# Patient Record
Sex: MALE | Race: WHITE | NOT HISPANIC OR LATINO | Employment: FULL TIME | URBAN - METROPOLITAN AREA
[De-identification: names, ages, dates, MRNs, and addresses within clinical notes are randomized per-mention and may not be internally consistent; named-entity substitution may affect disease eponyms.]

---

## 2022-12-27 ENCOUNTER — HOSPITAL ENCOUNTER (EMERGENCY)
Facility: HOSPITAL | Age: 22
Discharge: HOME | End: 2022-12-27
Attending: INTERNAL MEDICINE
Payer: COMMERCIAL

## 2022-12-27 VITALS
DIASTOLIC BLOOD PRESSURE: 82 MMHG | HEART RATE: 81 BPM | TEMPERATURE: 98 F | RESPIRATION RATE: 18 BRPM | WEIGHT: 214.63 LBS | OXYGEN SATURATION: 99 % | SYSTOLIC BLOOD PRESSURE: 134 MMHG

## 2022-12-27 DIAGNOSIS — S60.221A CONTUSION OF RIGHT HAND, INITIAL ENCOUNTER: Primary | ICD-10-CM

## 2022-12-27 PROCEDURE — 99283 EMERGENCY DEPT VISIT LOW MDM: CPT

## 2022-12-27 NOTE — ED PROVIDER NOTES
Encounter Date: 12/27/2022       History     Chief Complaint   Patient presents with    Hand Injury     States punched table and wall at 0100 this am. C/o pain to R hand.     22-year-old white male presents emergency department after punching a table several times and then turned around punched a wall with the right hand and he states is having trouble moving his 3rd and 4th digit so he wanted to come to see if it was broken    Review of patient's allergies indicates:  No Known Allergies  Past Medical History:   Diagnosis Date    Anxiety disorder, unspecified     Depression     PTSD (post-traumatic stress disorder)      No past surgical history on file.  No family history on file.  Social History     Tobacco Use    Smoking status: Never    Smokeless tobacco: Never   Substance Use Topics    Alcohol use: Yes    Drug use: Never     Review of Systems   Constitutional: Negative.  Negative for activity change, appetite change, chills, diaphoresis, fatigue, fever and unexpected weight change.   HENT: Negative.  Negative for congestion, dental problem, drooling, ear discharge, ear pain, facial swelling, hearing loss, mouth sores, nosebleeds, postnasal drip, rhinorrhea, sinus pressure, sinus pain, sneezing, sore throat, tinnitus, trouble swallowing and voice change.    Eyes: Negative.  Negative for photophobia, pain, discharge, redness, itching and visual disturbance.   Respiratory: Negative.  Negative for apnea, cough, choking, chest tightness, shortness of breath, wheezing and stridor.    Cardiovascular: Negative.  Negative for chest pain, palpitations and leg swelling.   Gastrointestinal: Negative.  Negative for abdominal distention, abdominal pain, anal bleeding, blood in stool, constipation, diarrhea, nausea, rectal pain and vomiting.   Endocrine: Negative.  Negative for cold intolerance, heat intolerance, polydipsia, polyphagia and polyuria.   Genitourinary: Negative.  Negative for decreased urine volume, difficulty  urinating, dysuria, enuresis, flank pain, frequency, genital sores, hematuria, penile discharge, penile pain, penile swelling, scrotal swelling, testicular pain and urgency.   Musculoskeletal: Negative.  Negative for arthralgias, back pain, gait problem, joint swelling, myalgias, neck pain and neck stiffness.        Hand pain   Skin: Negative.  Negative for color change, pallor, rash and wound.   Allergic/Immunologic: Negative.  Negative for environmental allergies, food allergies and immunocompromised state.   Neurological: Negative.  Negative for dizziness, tremors, seizures, syncope, facial asymmetry, speech difficulty, weakness, light-headedness, numbness and headaches.   Hematological: Negative.  Negative for adenopathy. Does not bruise/bleed easily.   Psychiatric/Behavioral: Negative.  Negative for agitation, behavioral problems, confusion, decreased concentration, dysphoric mood, hallucinations, self-injury, sleep disturbance and suicidal ideas. The patient is not nervous/anxious and is not hyperactive.    All other systems reviewed and are negative.    Physical Exam     Initial Vitals [12/27/22 0330]   BP Pulse Resp Temp SpO2   139/87 83 16 98.4 °F (36.9 °C) 99 %      MAP       --         Physical Exam    Nursing note and vitals reviewed.  Constitutional: He appears well-developed and well-nourished.   HENT:   Head: Normocephalic and atraumatic.   Eyes: Conjunctivae and EOM are normal. Pupils are equal, round, and reactive to light.   Neck: Neck supple.   Normal range of motion.  Cardiovascular:  Normal rate and regular rhythm.           Pulmonary/Chest: Breath sounds normal.   Abdominal: Abdomen is soft. Bowel sounds are normal.   Musculoskeletal:         General: Normal range of motion.        Hands:       Cervical back: Normal range of motion and neck supple.     Neurological: He is alert and oriented to person, place, and time.   Skin: Skin is warm and dry. Capillary refill takes less than 2 seconds.    Psychiatric: He has a normal mood and affect. His behavior is normal. Judgment and thought content normal.       ED Course   Procedures  Labs Reviewed - No data to display       Imaging Results              X-Ray Hand 3 view Right (Preliminary result)  Result time 12/27/22 04:07:57      ED Interpretation by Clarke Alanis MD (12/27/22 04:07:57, [facility])    No acute fractures or dislocations noted                                     Medications - No data to display                           Clinical Impression:   Final diagnoses:  [S60.221A] Contusion of right hand, initial encounter (Primary)        ED Disposition Condition    Discharge Stable          ED Prescriptions    None       Follow-up Information       Follow up With Specialties Details Why Contact Info    Surinder Prasad III, APRN-CNP Family Medicine In 3 days  [address] [facility] [address] [phone]            Kiersten Bangura is a certified MA and was present during the entire interaction with this patient      Clarke Alanis MD  12/27/22 7195

## 2024-12-24 ENCOUNTER — OFFICE VISIT (OUTPATIENT)
Dept: URGENT CARE | Facility: CLINIC | Age: 24
End: 2024-12-24
Payer: OTHER GOVERNMENT

## 2024-12-24 VITALS
DIASTOLIC BLOOD PRESSURE: 91 MMHG | TEMPERATURE: 99 F | RESPIRATION RATE: 17 BRPM | SYSTOLIC BLOOD PRESSURE: 129 MMHG | HEIGHT: 69 IN | HEART RATE: 87 BPM | BODY MASS INDEX: 31.7 KG/M2 | OXYGEN SATURATION: 97 % | WEIGHT: 214 LBS

## 2024-12-24 DIAGNOSIS — J00 ACUTE NASOPHARYNGITIS: Primary | ICD-10-CM

## 2024-12-24 DIAGNOSIS — R68.89 FLU-LIKE SYMPTOMS: ICD-10-CM

## 2024-12-24 LAB
CTP QC/QA: YES
CTP QC/QA: YES
MOLECULAR STREP A: NEGATIVE
POC MOLECULAR INFLUENZA A AGN: NEGATIVE
POC MOLECULAR INFLUENZA B AGN: NEGATIVE

## 2024-12-24 RX ORDER — BETAMETHASONE SODIUM PHOSPHATE AND BETAMETHASONE ACETATE 3; 3 MG/ML; MG/ML
9 INJECTION, SUSPENSION INTRA-ARTICULAR; INTRALESIONAL; INTRAMUSCULAR; SOFT TISSUE ONCE
Status: COMPLETED | OUTPATIENT
Start: 2024-12-24 | End: 2024-12-24

## 2024-12-24 RX ADMIN — BETAMETHASONE SODIUM PHOSPHATE AND BETAMETHASONE ACETATE 9 MG: 3; 3 INJECTION, SUSPENSION INTRA-ARTICULAR; INTRALESIONAL; INTRAMUSCULAR; SOFT TISSUE at 12:12

## 2024-12-24 NOTE — PROGRESS NOTES
"Subjective:      Patient ID: Johnathan Lewis is a 24 y.o. male.    Vitals:  height is 5' 9" (1.753 m) and weight is 97.1 kg (214 lb). His oral temperature is 98.6 °F (37 °C). His blood pressure is 129/91 (abnormal) and his pulse is 87. His respiration is 17 and oxygen saturation is 97%.     Chief Complaint: Sore Throat (Started 1 days, sore throat, cough, congestion, fatigue, gen body aches, bilateral ear pain, fever last night, chills)    HPI:  24-year-old male present to clinic with concerns of nasal congestion, sinus congestion, sore throat and bilateral ear pressure worse on right side and fever started last night.  T-max at home 99.6.  Over-the-counter medications some help.  No fever in the clinic.  No chest pain, no shortness a breath.  States known for strep in the past.  No positive exposure to infections recently.  Requesting for cortisone injection as it helped in the past, understands the risks and benefits    ROS :  Constitutional : _ fever , Body aches, Chills  Eyes : _No redness, drainage or pain  HENT_sore throat, postnasal drainage  Respiratory_no wheezing, no shortness of breath  Cardiovascular_no chest pain  Gastrointestinal_ denies nausea vomiting abdominal pain or diarrhea  Musculoskeletal_no joint pain, no joint swelling  Integumentary_no skin rash    Objective:     Physical Exam  General : Alert and Oriented, No apparent distress, afebrile, sounds stuffy and congested  Neck - supple  HENT : Oropharynx and tonsils mild redness and swelling, no exudate, tonsils 2+ bilateral, left ear canal cerumen impaction, right TM intact moderate fluid, no redness  Respiratory : Bilateral equal breath sounds, nonlabored respirations  Cardiovascular : Rate, rhythm regular, normal volume pulse, no murmur  Gastrointestinal: Full abdomen, soft, nontender to palpate  Integumentary : Warm, Dry and no rash    Assessment:     1. Acute nasopharyngitis    2. Flu-like symptoms      Plan:   Discussed the physical finding, " possible early testing examination.  Monitor the symptoms closely.  Flu test negative, strep test negative  Symptomatic treatment for now.  Antihistamine of choice like Claritin Zyrtec or Allegra for congestion  Warm saltwater gargles for sore throat.  Robitussin DM for cough and cold  Celestone IM today as anti inflammation for symptom relief, risk and benefits discussed voiced understanding  For worsening symptoms can return to clinic for re swabs as needed as nurse's visit  Call or return to clinic for any questions.  Voiced understanding    Acute nasopharyngitis  -     betamethasone acetate-betamethasone sodium phosphate injection 9 mg    Flu-like symptoms  -     POCT Influenza A/B MOLECULAR  -     POCT Strep A, Molecular

## 2024-12-24 NOTE — PATIENT INSTRUCTIONS
Discussed the physical finding, possible early testing examination.  Monitor the symptoms closely.  Flu test negative, strep test negative  Symptomatic treatment for now.  Antihistamine of choice like Claritin Zyrtec or Allegra for congestion  Warm saltwater gargles for sore throat.  Robitussin DM for cough and cold  Celestone IM today as anti inflammation for symptom relief, risk and benefits discussed voiced understanding  For worsening symptoms can return to clinic for re swabs as needed as nurse's visit  Call or return to clinic for any questions.  Voiced understanding

## 2025-02-06 DIAGNOSIS — M25.561 ACUTE PAIN OF RIGHT KNEE: Primary | ICD-10-CM

## 2025-02-12 ENCOUNTER — OFFICE VISIT (OUTPATIENT)
Dept: ORTHOPEDICS | Facility: CLINIC | Age: 25
End: 2025-02-12
Payer: COMMERCIAL

## 2025-02-12 ENCOUNTER — HOSPITAL ENCOUNTER (OUTPATIENT)
Dept: RADIOLOGY | Facility: CLINIC | Age: 25
Discharge: HOME | End: 2025-02-12
Attending: REHABILITATION UNIT
Payer: COMMERCIAL

## 2025-02-12 VITALS
DIASTOLIC BLOOD PRESSURE: 85 MMHG | HEIGHT: 69 IN | HEART RATE: 90 BPM | SYSTOLIC BLOOD PRESSURE: 133 MMHG | WEIGHT: 214.06 LBS | BODY MASS INDEX: 31.71 KG/M2

## 2025-02-12 DIAGNOSIS — M25.561 ACUTE PAIN OF RIGHT KNEE: ICD-10-CM

## 2025-02-12 PROCEDURE — 3008F BODY MASS INDEX DOCD: CPT | Mod: CPTII,,, | Performed by: REHABILITATION UNIT

## 2025-02-12 PROCEDURE — 3079F DIAST BP 80-89 MM HG: CPT | Mod: CPTII,,, | Performed by: REHABILITATION UNIT

## 2025-02-12 PROCEDURE — 99203 OFFICE O/P NEW LOW 30 MIN: CPT | Mod: ,,, | Performed by: REHABILITATION UNIT

## 2025-02-12 PROCEDURE — 3075F SYST BP GE 130 - 139MM HG: CPT | Mod: CPTII,,, | Performed by: REHABILITATION UNIT

## 2025-02-12 PROCEDURE — 73564 X-RAY EXAM KNEE 4 OR MORE: CPT | Mod: RT,,, | Performed by: REHABILITATION UNIT

## 2025-02-12 RX ORDER — DICLOFENAC SODIUM 75 MG/1
75 TABLET, DELAYED RELEASE ORAL 2 TIMES DAILY
Qty: 28 TABLET | Refills: 0 | Status: SHIPPED | OUTPATIENT
Start: 2025-02-12 | End: 2025-02-26

## 2025-02-12 NOTE — PROGRESS NOTES
Subjective:      Patient ID: Johnathan Lewis is a 24 y.o. male.    Chief Complaint: Pain of the Right Knee (MRI report in media done 2/10/25- Stated was doing training for  and stepped in a hole and fell forward. Present today wearing knee stabilizer and crutches. Has swelling in knee and feels very unstable. Is taking tylenol which helps with some of the pain. )    HPI:   Johnathan Lewis is a 24 y.o. male who presents today for initial evaluation of his right knee    History of Present Illness    CHIEF COMPLAINT:  - Right knee pain and injury    HPI:  Johnathan presents with a right knee injury sustained during  training approximately two weeks ago. On January 28, 2025, while sprinting during a training exercise, he stepped in a hole. His leg went in, his body went forward, and he felt a pop, accompanied by instant pain and swelling. He was unable to walk on the affected leg, reporting that it was unstable when attempting to bear weight.    He was immediately taken to [facility]'s emergency room, where X-rays were performed. He was given a knee immobilizer and crutches. An MRI was not performed at that time. For the past two weeks, he has been confined to the [facility] in [place], with limited movement of the knee.    Initial pain was located on the medial aspect of the knee, with some discomfort across the front. Pointing his toes down would cause a pulling sensation in the upper part of his knee. Currently, he characterizes symptoms as discomfort rather than severe pain.    Johnathan has been using OTC medications such as ibuprofen and Tylenol for pain management. An MRI was performed two days prior to this visit, the results of which were discussed during the consultation.    His mobility has been significantly limited due to the use of the knee immobilizer. He reports difficulty bending the knee and having to force it back up when attempting to move it.    Johnathan denies any history of knee  "problems.    PREVIOUS TREATMENTS:  - Johnathan was given a knee immobilizer and crutches at the ER immediately after the injury, which he has been using for the past two weeks.  - Johnathan has been trying to let the knee bend slightly while in the [place], but has had limited success.    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  - Johnathan was injured during  training, specifically during an exercise mission.  - The injury has affected the patient's ability to participate in training activities.  - Johnathan has been confined to the barracks for two weeks following the injury.    SOCIAL HISTORY:  -  History: Johnathan is in [employer].  - Johnathan was doing  training in preparation for deployment when the knee injury occurred.  - Johnathan is scheduled for deployment in April.      ROS:  Musculoskeletal: +joint pain, +joint swelling         Past Medical History:   Diagnosis Date    Anxiety disorder, unspecified     Depression     PTSD (post-traumatic stress disorder)      Past Surgical History:   Procedure Laterality Date    APPENDECTOMY  2010     Social History     Socioeconomic History    Marital status: Single   Tobacco Use    Smoking status: Never    Smokeless tobacco: Current     Types: Chew    Tobacco comments:     Chewing tobacco everyday   Substance and Sexual Activity    Alcohol use: Yes     Alcohol/week: 1.0 standard drink of alcohol     Types: 1 Cans of beer per week    Drug use: Never    Sexual activity: Not Currently     Partners: Female         Current Outpatient Medications:     diclofenac (VOLTAREN) 75 MG EC tablet, Take 1 tablet (75 mg total) by mouth 2 (two) times daily. for 14 days, Disp: 28 tablet, Rfl: 0  Review of patient's allergies indicates:  No Known Allergies    /85   Pulse 90   Ht 5' 9" (1.753 m)   Wt 97.1 kg (214 lb 1.1 oz)   BMI 31.61 kg/m² "     Comprehensive review of systems completed and negative except as per HPI.        Objective:   Head: Normocephalic, without obvious abnormality, atraumatic  Eyes: conjunctivae/corneas clear. EOM's intact  Ears: normal external appearance  Nose: Nares normal. Septum midline. Mucosa normal. No drainage  Throat: normal findings: lips normal without lesions  Lungs: unlabored breathing on room air  Chest wall: symmetric chest rise  Heart: regular rate and rhythm  Pulses: 2+ and symmetric  Skin: Skin color, texture, turgor normal. No rashes or lesions  Neurologic: Grossly normal    right KNEE:     Alignment: neutral  Appearance: skin is intact with mild circumferential soft tissue swelling  Effusion: none  Gait: antalgic  Ankle ROM: full and painless   Knee ROM:  0 - 40  Tenderness: diffuse TTP    Patellar Mobility: normal  Patellar grind: negative  PF Crepitus: negative        Paul Test: negative    Valgus Stress @ 0 deg: stable  Valgus Stress @ 30 deg: stable  Varus Stress @ 0 deg: stable  Varus Stress @ 30 deg: stable  Lachman: stable  Ant Drawer: negative   Post Drawer: negative  Posterior Sag Sign: negative  Neurological deficits: SILT dp/sp/t distributions  Motor: 5/5 EHL/FHL/TA/GS    Warm well perfused lower extremity with capillary refill less than 2 seconds and sensation intact to light touch in the terminal nerve distributions. Calf soft and easily compressible without clinical sign of DVT. No palpable popliteal lymphadenopathy    Assessment:     Imaging:   - X-rays of the right knee: January 28, 2025, The x-rays showed that the bones and alignment look alright, joint spaces still look good, and behind the kneecap looks good.  - MRI of the right knee: February 9, 2025, The MRI revealed:  - Mild sprain of the distal IT band  - Circumferential swelling around the knee  - No significant structural issues  - Intact ligaments (PCL, ACL, MCL, LCL)  - Intact menisci  - Normal cartilage  - Some soft tissue  swelling        1. Acute pain of right knee          Plan:       Orders Placed This Encounter    X-Ray Knee Complete 4 Or More Views Right    Ambulatory Referral/Consult to Physical Therapy/Occupational Therapy    diclofenac (VOLTAREN) 75 MG EC tablet        Imaging and exam findings discussed.     Assessment & Plan    MEDICATIONS:  - Started prescription strength anti-inflammatory medication.    REFERRALS:  - Referred to physical therapy for knee rehabilitation, 2-3 times per week.    PROCEDURES:  - Reviewed x-rays and MRI results with the patient, explaining the findings including mild sprain of the distal IT band and circumferential swelling around the knee.    FOLLOW UP:  - Follow up in 1 month or sooner if significant improvement occurs.  - Contact the office if ready for release before the scheduled follow-up.    PATIENT INSTRUCTIONS:  - Discontinue use of knee immobilizer.  - Use crutches for ambulation until cleared by physical therapy.       All questions were answered. Patient happy and in agreement with the plan.     This note was generated with the assistance of ambient listening technology. Verbal consent was obtained by the patient and accompanying visitor(s) for the recording of patient appointment to facilitate this note. I attest to having reviewed and edited the generated note for accuracy, though some syntax or spelling errors may persist. Please contact the author of this note for any clarification.

## 2025-02-12 NOTE — LETTER
February 12, 2025    Johnathan Lewis  [address] [place] [address]          [facility]  [address], [address]  [facility] [address]  Phone: [phone]  Fax: [phone] February 12, 2025     Patient: Johnathan Lewis   YOB: 2000   Date of Visit: 2/12/2025       To Whom It May Concern:    It is my medical opinion that Johnathan Lewis  needs to avoid any high impact activity for 1 month. Patient will follow up with me in one month .    If you have any questions or concerns, please don't hesitate to call.    Sincerely,        Sam Sequeira MD

## 2025-02-25 ENCOUNTER — OFFICE VISIT (OUTPATIENT)
Dept: ORTHOPEDICS | Facility: CLINIC | Age: 25
End: 2025-02-25
Payer: COMMERCIAL

## 2025-02-25 VITALS
WEIGHT: 215 LBS | DIASTOLIC BLOOD PRESSURE: 79 MMHG | BODY MASS INDEX: 31.84 KG/M2 | SYSTOLIC BLOOD PRESSURE: 125 MMHG | HEIGHT: 69 IN | HEART RATE: 80 BPM

## 2025-02-25 DIAGNOSIS — M25.561 ACUTE PAIN OF RIGHT KNEE: Primary | ICD-10-CM

## 2025-02-25 PROCEDURE — 3074F SYST BP LT 130 MM HG: CPT | Mod: CPTII,,, | Performed by: REHABILITATION UNIT

## 2025-02-25 PROCEDURE — 3078F DIAST BP <80 MM HG: CPT | Mod: CPTII,,, | Performed by: REHABILITATION UNIT

## 2025-02-25 PROCEDURE — 3008F BODY MASS INDEX DOCD: CPT | Mod: CPTII,,, | Performed by: REHABILITATION UNIT

## 2025-02-25 PROCEDURE — 1159F MED LIST DOCD IN RCRD: CPT | Mod: CPTII,,, | Performed by: REHABILITATION UNIT

## 2025-02-25 PROCEDURE — 99213 OFFICE O/P EST LOW 20 MIN: CPT | Mod: ,,, | Performed by: REHABILITATION UNIT

## 2025-02-25 NOTE — PROGRESS NOTES
Subjective:      Patient ID: Johnathan Lewis is a 24 y.o. male.    Chief Complaint: Follow-up of the Right Knee (F/u Rt knee pain patient states he is hoping to be released. He is feeling good he has no pain and he can bend his knee. He is ready to go back to work. )    HPI:   Johnathan Lewis is a 24 y.o. male who presents today for f/u evaluation of his right knee    He is doing very well.  No pain.  He went to his initial physical therapy evaluation and has been working on his own otherwise.  He is eager for for release and returned.    History of Present Illness    CHIEF COMPLAINT:  - Right knee pain and injury    Initial HPI:  Johnathan presents with a right knee injury sustained during  training approximately two weeks ago. On January 28, 2025, while sprinting during a training exercise, he stepped in a hole. His leg went in, his body went forward, and he felt a pop, accompanied by instant pain and swelling. He was unable to walk on the affected leg, reporting that it was unstable when attempting to bear weight.    He was immediately taken to [facility]'s emergency room, where X-rays were performed. He was given a knee immobilizer and crutches. An MRI was not performed at that time. For the past two weeks, he has been confined to the [facility] in [place], with limited movement of the knee.    Initial pain was located on the medial aspect of the knee, with some discomfort across the front. Pointing his toes down would cause a pulling sensation in the upper part of his knee. Currently, he characterizes symptoms as discomfort rather than severe pain.    Johnathan has been using OTC medications such as ibuprofen and Tylenol for pain management. An MRI was performed two days prior to this visit, the results of which were discussed during the consultation.    His mobility has been significantly limited due to the use of the knee immobilizer. He reports difficulty bending the knee and having to force it  "back up when attempting to move it.    Johnathan denies any history of knee problems.    PREVIOUS TREATMENTS:  - Johnathan was given a knee immobilizer and crutches at the ER immediately after the injury, which he has been using for the past two weeks.  - Johnathan has been trying to let the knee bend slightly while in the barracks, but has had limited success.    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  - Johnathan was injured during  training, specifically during an exercise mission.  - The injury has affected the patient's ability to participate in training activities.  - Johnathan has been confined to the barracks for two weeks following the injury.    SOCIAL HISTORY:  -  History: Johnathan is in [employer].  - Johnathan was doing  training in preparation for deployment when the knee injury occurred.  - Johnathan is scheduled for deployment in April.      ROS:  Musculoskeletal: +joint pain, +joint swelling         Past Medical History:   Diagnosis Date    Anxiety disorder, unspecified     Depression     PTSD (post-traumatic stress disorder)      Past Surgical History:   Procedure Laterality Date    APPENDECTOMY  2010     Social History     Socioeconomic History    Marital status: Single   Tobacco Use    Smoking status: Never    Smokeless tobacco: Current     Types: Chew    Tobacco comments:     Chewing tobacco everyday   Substance and Sexual Activity    Alcohol use: Yes     Alcohol/week: 1.0 standard drink of alcohol     Types: 1 Cans of beer per week    Drug use: Never    Sexual activity: Not Currently     Partners: Female       No current outpatient medications on file.  Review of patient's allergies indicates:  No Known Allergies    /79 (BP Location: Left arm, Patient Position: Sitting)   Pulse 80   Ht 5' 9" (1.753 m)   Wt 97.5 kg (215 lb)   BMI 31.75 kg/m²     Comprehensive " review of systems completed and negative except as per HPI.        Objective:   Head: Normocephalic, without obvious abnormality, atraumatic  Eyes: conjunctivae/corneas clear. EOM's intact  Ears: normal external appearance  Nose: Nares normal. Septum midline. Mucosa normal. No drainage  Throat: normal findings: lips normal without lesions  Lungs: unlabored breathing on room air  Chest wall: symmetric chest rise  Heart: regular rate and rhythm  Pulses: 2+ and symmetric  Skin: Skin color, texture, turgor normal. No rashes or lesions  Neurologic: Grossly normal    right KNEE:     Alignment: neutral  Appearance: skin is intact with mild circumferential soft tissue swelling which is improved  Effusion: none  Gait: wnl  Ankle ROM: full and painless   Knee ROM:  0 - 130  Tenderness: none  Patellar Mobility: normal  Patellar grind: negative  PF Crepitus: negative        Paul Test: negative    Valgus Stress @ 0 deg: stable  Valgus Stress @ 30 deg: stable  Varus Stress @ 0 deg: stable  Varus Stress @ 30 deg: stable  Lachman: stable  Ant Drawer: negative   Post Drawer: negative  Posterior Sag Sign: negative  Neurological deficits: SILT dp/sp/t distributions  Motor: 5/5 EHL/FHL/TA/GS    Warm well perfused lower extremity with capillary refill less than 2 seconds and sensation intact to light touch in the terminal nerve distributions. Calf soft and easily compressible without clinical sign of DVT. No palpable popliteal lymphadenopathy    Assessment:     Imaging:   - X-rays of the right knee: January 28, 2025, The x-rays showed that the bones and alignment look alright, joint spaces still look good, and behind the kneecap looks good.  - MRI of the right knee: February 9, 2025, The MRI revealed:  - Mild sprain of the distal IT band  - Circumferential swelling around the knee  - No significant structural issues  - Intact ligaments (PCL, ACL, MCL, LCL)  - Intact menisci  - Normal cartilage  - Some soft tissue swelling        1.  Acute pain of right knee            Plan:               Imaging and exam findings discussed.  He is rehabbing well.  He has full and painless motion.  He is tolerating his activities.  No instability or mechanical symptoms.  Progress back to full activities with full release.  I am going to fit him with hinged knee sleeve to provide some comfort with his progression.  He will follow up with me as needed. All questions were answered. Patient happy and in agreement with the plan.     This note was generated with the assistance of ambient listening technology. Verbal consent was obtained by the patient and accompanying visitor(s) for the recording of patient appointment to facilitate this note. I attest to having reviewed and edited the generated note for accuracy, though some syntax or spelling errors may persist. Please contact the author of this note for any clarification.

## 2025-02-25 NOTE — LETTER
February 25, 2025    Johnathan Lewis  [address] [place] [address]          [facility]  [address], [address]  [facility] [address]  Phone: [phone]  Fax: [phone] February 25, 2025     Patient: Johnathan Lewis   YOB: 2000   Date of Visit: 2/25/2025       To Whom It May Concern:    It is my medical opinion that Johnathan Lewis may return to full duty immediately with no restrictions.    If you have any questions or concerns, please don't hesitate to call.    Sincerely,        Sam Sequeira MD